# Patient Record
Sex: FEMALE | Race: BLACK OR AFRICAN AMERICAN
[De-identification: names, ages, dates, MRNs, and addresses within clinical notes are randomized per-mention and may not be internally consistent; named-entity substitution may affect disease eponyms.]

---

## 2020-06-23 ENCOUNTER — HOSPITAL ENCOUNTER (OUTPATIENT)
Dept: HOSPITAL 62 - RAD | Age: 20
End: 2020-06-23
Attending: MIDWIFE
Payer: MEDICAID

## 2020-06-23 DIAGNOSIS — Z34.01: Primary | ICD-10-CM

## 2020-06-23 PROCEDURE — 76801 OB US < 14 WKS SINGLE FETUS: CPT

## 2020-06-23 NOTE — RADIOLOGY REPORT (SQ)
EXAM DESCRIPTION:  U/S [id number] TRNABD 1GES W/ODOP



IMAGES COMPLETED DATE/TIME:  6/23/2020 10:32 am



REASON FOR STUDY:  Z34.01 ENCNTR FOR SUPRVSN OF NORMAL FIRST PREG, FIRST TRIMESTER Z34.01  ENCNTR FOR
 SUPRVSN OF NORMAL FIRST PREG, FIRST TRIMES



COMPARISON:  None.



TECHNIQUE:  Transabdominal static and realtime grayscale images acquired of the pelvis. Additional se
lected spectral and color Doppler images recorded. All images stored on PACs.

bHCG: Not applicable.

CLINICAL DATES:  13 weeks 1 day



LIMITATIONS:  None.



FINDINGS:  FETUS:  Single Living intrauterine pregnancy.

ULTRASOUND EGA: 12 weeks 3 days

ULTRASOUND VICTOR HUGO: 1/2/2021

EFW: Not applicable less than 20 weeks.

CRL:  6 cm.

FHR: 158  beats per minute.

FETAL SURVEY: Too early to assess.

AMNIOTIC FLUID: Adequate amount.

PLACENTA: Not yet developed due to early gestation.

SUBCHORIONIC BLEED: No

SIZE OF BLEED: Not applicable.

UTERUS: No masses. No anomalies.

CERVICAL LENGTH: 2.6 cm.   Closed.

RIGHT ADNEXA: Normal ovary with normal vascular flow.  1.7 x 1.2 x 1.6 cm.

No adnexal free fluid.

No adnexal masses.

LEFT ADNEXA: Normal ovary with normal vascular flow.  3.3 x 2.4 x 1.7 cm.

No adnexal free fluid.

No adnexal masses.

FREE FLUID: None.

OTHER: No other significant finding.



IMPRESSION:  LIVING INTRAUTERINE PREGNANCY.

EGA 12 weeks 3 days

Trimester of pregnancy: First trimester - 0 to 13 weeks.



TECHNICAL DOCUMENTATION:  JOB ID:  [id number]

 2011 [employer]- All Rights Reserved                            rev-5/18



Reading location - IP/workstation name: [email or web address]

## 2020-11-16 ENCOUNTER — HOSPITAL ENCOUNTER (OUTPATIENT)
Dept: HOSPITAL 62 - LC | Age: 20
Discharge: HOME | End: 2020-11-16
Attending: OBSTETRICS & GYNECOLOGY
Payer: COMMERCIAL

## 2020-11-16 DIAGNOSIS — Z3A.34: ICD-10-CM

## 2020-11-16 DIAGNOSIS — Z34.03: Primary | ICD-10-CM

## 2020-11-16 PROCEDURE — 59025 FETAL NON-STRESS TEST: CPT

## 2020-11-16 NOTE — NON STRESS TEST REPORT
=================================================================

Non Stress Test

=================================================================

Datetime Report Generated by CPN: 11/16/2020 12:11

   

   

=================================================================

DEMOGRAPHIC

=================================================================

   

Test Number:  1

EGA NST:  34.0

   

=================================================================

INDICATION

=================================================================

   

Indication for Study (NST) Other:  repeat NST NR in office

   

=================================================================

VITAL SIGNS

=================================================================

   

Temperature - NST:  98.8

Pulse - NST:  89

RESP - NST:  15

NBPSYS NST:  104

NBPDIA NST:  57

   

=================================================================

MONITORING

=================================================================

   

Monitor Explained:  Monitor Explained; Test Explained; Patient

   Verbalized Understanding

Time on Monitor:  11/16/2020 11:36

Time off Monitor:  11/16/2020 11:59

NST Duration:  23

   

=================================================================

NST INTERVENTIONS

=================================================================

   

NST Interventions:  PO Hydration; Reposition Patient

Physician Notified NST:  RAUDEL Hicks, RAHEEL

BABY A:  [id number]

   

=================================================================

BABY A

=================================================================

   

Fetal Movement :  Present

Contraction Frequency :  Irritabilty

FHR Baseline :  130

Accelerations :  15X15

Decelerations :  None

Variability :  Moderate 6-25bpm

NST Review:  Meets Criteria for Reactive NST

NST Review and Verified By :  AMBROSIO Turner Results:  Reactive

   

=================================================================

NST REPORT

=================================================================

   

Report Trigger:  Send Report

## 2020-12-22 ENCOUNTER — HOSPITAL ENCOUNTER (INPATIENT)
Dept: HOSPITAL 62 - LC | Age: 20
LOS: 4 days | Discharge: HOME | End: 2020-12-26
Attending: OBSTETRICS & GYNECOLOGY | Admitting: OBSTETRICS & GYNECOLOGY
Payer: COMMERCIAL

## 2020-12-22 DIAGNOSIS — Z20.828: ICD-10-CM

## 2020-12-22 DIAGNOSIS — Z3A.39: ICD-10-CM

## 2020-12-22 DIAGNOSIS — D64.9: ICD-10-CM

## 2020-12-22 DIAGNOSIS — O36.5930: Primary | ICD-10-CM

## 2020-12-22 LAB
ADD MANUAL DIFF: NO
APPEARANCE UR: (no result)
APTT PPP: YELLOW S
BARBITURATES UR QL SCN: NEGATIVE
BASOPHILS # BLD AUTO: 0 10^3/UL (ref 0–0.2)
BASOPHILS NFR BLD AUTO: 0.2 % (ref 0–2)
BILIRUB UR QL STRIP: NEGATIVE
EOSINOPHIL # BLD AUTO: 0.1 10^3/UL (ref 0–0.6)
EOSINOPHIL NFR BLD AUTO: 1.1 % (ref 0–6)
ERYTHROCYTE [DISTWIDTH] IN BLOOD BY AUTOMATED COUNT: 14 % (ref 11.5–14)
GLUCOSE UR STRIP-MCNC: NEGATIVE MG/DL
HCT VFR BLD CALC: 31.4 % (ref 36–47)
HGB BLD-MCNC: 10.8 G/DL (ref 12–15.5)
KETONES UR STRIP-MCNC: NEGATIVE MG/DL
LYMPHOCYTES # BLD AUTO: 1.4 10^3/UL (ref 0.5–4.7)
LYMPHOCYTES NFR BLD AUTO: 26.7 % (ref 13–45)
MCH RBC QN AUTO: 32.4 PG (ref 27–33.4)
MCHC RBC AUTO-ENTMCNC: 34.3 G/DL (ref 32–36)
MCV RBC AUTO: 94 FL (ref 80–97)
METHADONE UR QL SCN: NEGATIVE
MONOCYTES # BLD AUTO: 0.5 10^3/UL (ref 0.1–1.4)
MONOCYTES NFR BLD AUTO: 8.8 % (ref 3–13)
NEUTROPHILS # BLD AUTO: 3.4 10^3/UL (ref 1.7–8.2)
NEUTS SEG NFR BLD AUTO: 63.2 % (ref 42–78)
NITRITE UR QL STRIP: NEGATIVE
PCP UR QL SCN: NEGATIVE
PH UR STRIP: 7 [PH] (ref 5–9)
PLATELET # BLD: 170 10^3/UL (ref 150–450)
PROT UR STRIP-MCNC: NEGATIVE MG/DL
RBC # BLD AUTO: 3.33 10^6/UL (ref 3.72–5.28)
SP GR UR STRIP: 1.01
TOTAL CELLS COUNTED % (AUTO): 100 %
URINE AMPHETAMINES SCREEN: NEGATIVE
URINE BENZODIAZEPINES SCREEN: NEGATIVE
URINE COCAINE SCREEN: NEGATIVE
URINE MARIJUANA (THC) SCREEN: NEGATIVE
UROBILINOGEN UR-MCNC: 2 MG/DL (ref ?–2)
WBC # BLD AUTO: 5.4 10^3/UL (ref 4–10.5)

## 2020-12-22 PROCEDURE — 86900 BLOOD TYPING SEROLOGIC ABO: CPT

## 2020-12-22 PROCEDURE — 86901 BLOOD TYPING SEROLOGIC RH(D): CPT

## 2020-12-22 PROCEDURE — 85027 COMPLETE CBC AUTOMATED: CPT

## 2020-12-22 PROCEDURE — 86592 SYPHILIS TEST NON-TREP QUAL: CPT

## 2020-12-22 PROCEDURE — 88307 TISSUE EXAM BY PATHOLOGIST: CPT

## 2020-12-22 PROCEDURE — 80307 DRUG TEST PRSMV CHEM ANLYZR: CPT

## 2020-12-22 PROCEDURE — 86850 RBC ANTIBODY SCREEN: CPT

## 2020-12-22 PROCEDURE — 36415 COLL VENOUS BLD VENIPUNCTURE: CPT

## 2020-12-22 PROCEDURE — 85025 COMPLETE CBC W/AUTO DIFF WBC: CPT

## 2020-12-22 PROCEDURE — 81005 URINALYSIS: CPT

## 2020-12-23 NOTE — ADMISSION PHYSICAL
=================================================================



=================================================================

Datetime Report Generated by CPN: 2020 10:09

   

   

=================================================================

CURRENT ADMISSION

=================================================================

   

Chief Complaint:  Scheduled Induction of Labor

Indication for Induction:  IUGR

Admit Impression :  Term, Intrauterine Pregnancy; No Active Labor

Admit Plan:  Initiate Labor Induction Protocol

Admit Plan- Other:  GBS neg

   

=================================================================

ALLERGIES

=================================================================

   

Medication Allergies:  No

Medication Allergies:  No Known Allergies (2020)

Latex:  No Latex Allergies

   

=================================================================

OBSTETRICAL HISTORY

=================================================================

   

EDC:  2020 00:00

:  1

Para:  0

Term:  0

:  0

SAB:  0

IAB:  0

Ectopic:  0

Livin

Cesareans:  0

VBACs:  0

Multiple Births:  0

Gestational Diabetes:  No

Rh Sensitization:  No

Incompetent Cervix:  No

REILLY:  No

Infertility:  No

ART Treatment:  No

Uterine Anomaly:  No

IUGR:  No

Hx Previous C/S:  No

Macrosomia:  No

Hx Loss/Stillborn:  No

PIH:  No

Hx  Death:  No

Placenta Previa/Abruption:  No

Depression/PP Depression:  No

PTL/PROM:  No

Post Partum Hemorrhage:  No

Current Pregnancy Procedures:  Ultrasound; NST

Obstetrical History Comments:  G1- Current IUGR 

   

=================================================================

***SEE PRENATAL RECORDS***

=================================================================

   

Alcohol:  No

Marijuana :  No

Cocaine:  No

Other Illicit Drugs:  No

Cigarettes:  Never Smoker. [id number]

   

=================================================================

MEDICAL HISTORY

=================================================================

   

Diabetes:  No

Blood Transfusion:  No

Pulmonary Disease (Asthma, TB):  No

Breast Disease:  No

Hypertension:  No

Gyn Surgery:  No

Heart Disease:  No

Hosp/Surgery:  No

Autoimmune Disorder:  No

Anesthetic Complications:  No

Kidney Disease:  No

Abnormal Pap Smear:  No

Neuro/Epilepsy:  No

Psychiatric Disorders:  No

Other Medical Diseases:  Yes

Hepatitis/Liver Disease:  No

Significant Family History:  No

Varicosities/Phlebitis:  No

Trauma/Violence :  No

Thyroid Dysfunction:  No

Medical History Comments:  Anemia with pregnancy 

   

=================================================================

INFECTIOUS HISTORY

=================================================================

   

Gonorrhea:  No

Genital Herpes:  No

Chlamydia:  No

Tuberculosis:  No

Syphilis:  No

Hepatitis:  No

HIV/AIDS Exposure:  No

Rash or Viral Illness:  No

HPV:  No

   

=================================================================

PHYSICAL EXAM

=================================================================

   

General:  Normal

HEENT:  Deferred

Neurologic:  Normal

Thyroid:  Deferred

Heart:  Normal

Lungs:  Normal

Breast:  Deferred

Back:  Deferred

Abdomen:  Normal

Genitourinary Exam:  Normal

Extremities:  Normal

DTRs:  Deferred

Pelvic Type:  Adequate

Vital Signs:  Reviewed

   

=================================================================

MEMBRANES

=================================================================

   

Membranes:  Intact

   

=================================================================

FETUS A

=================================================================

   

EGA:  39.2

Monitoring:  External US

FHR- Baseline:  120

Variability:  Moderate 6-25bpm

Accelerations:  15X15

Decelerations:  None

Fetal Presentation:  Vertex

Admit Comment:  had cervidil last night, plan to start pitocin today 

   plan per Dr. Dalton

   EFW 2599g on 20 at [facility] 6th%tile

   

=================================================================

PLANS FOR LABOR AND DELIVERY

=================================================================

   

Labor and Delivery:  None

Pain Management:  Epidural (Annotations: Data stored by N on behalf

   of user)

Feeding Preference:  Breast

Benefit of Breast Feed Discussed:  Yes

Circumcision:  N/A

   

=================================================================

INFORMED CONSENT

=================================================================

   

Assignment:  Joo Dalton MD

Signature:  Electronically signed by Katheryn Pinto CNM on 2020 at

   10:08  with User ID: [email or web address]

:  Electronically signed by Katheryn Pinto CNM on 2020 at 10:08 

   with User ID: [email or web address]

## 2020-12-23 NOTE — L&D PROGRESS NOTES
=================================================================

PROGRESS NOTES

=================================================================

Datetime Report Generated by CPN: 12/23/2020 16:44

   

   

=================================================================

PROGRESS NOTE

=================================================================

   

Procedures:  Sterile Vag Exam

Plan:  Continue Present Management

Comment:  plan per Dr. Dalton to stop pit this pm and place cervidil

   

=================================================================

VAGINAL EXAM

=================================================================

   

Dilatation:  1

Effacement:  50

   

=================================================================

LAST VAGINAL EXAM-NURSING

=================================================================

   

Nursing Exam Dilitation:  1.0

Nursing Exam Effacement:  50

Nursing Exam Station:  -2

Nursing Exam Contractions:  Unable to determine cx pattern d/t pt

   movement, TOCO readjusted 

   

=================================================================

MEMBRANES

=================================================================

   

Membranes:  Intact

   

=================================================================

FETUS A

=================================================================

   

FHR - Baseline:  125

Monitoring:  External US

Variability:  Moderate 6-25bpm

Decelerations:  None

:  39.2

Fetal Presentation:  Vertex

   

=================================================================

SIGNATURE

=================================================================

   

SIGNATURE:  [id number];[id number];[id number]

Assignment:  Joo Dalton MD

Signature:  Electronically signed by Katheryn Pinto CNM on 12/23/2020 at

   16:42  with User ID: [email or web address]

:  Electronically signed by Katheryn Pinto CNM on 12/23/2020 at 16:42 

   with User ID: [email or web address]

## 2020-12-24 PROCEDURE — 10907ZC DRAINAGE OF AMNIOTIC FLUID, THERAPEUTIC FROM PRODUCTS OF CONCEPTION, VIA NATURAL OR ARTIFICIAL OPENING: ICD-10-PCS | Performed by: OBSTETRICS & GYNECOLOGY

## 2020-12-24 PROCEDURE — 3E033VJ INTRODUCTION OF OTHER HORMONE INTO PERIPHERAL VEIN, PERCUTANEOUS APPROACH: ICD-10-PCS | Performed by: OBSTETRICS & GYNECOLOGY

## 2020-12-24 NOTE — L&D PROGRESS NOTES
=================================================================

PROGRESS NOTES

=================================================================

Datetime Report Generated by CPN: 12/24/2020 10:41

   

   

=================================================================

PROGRESS NOTE

=================================================================

   

Impression:  Normal Progression of Labor

Procedures:  Artificial ROM; Sterile Vag Exam

Plan:  Continue Present Management; Induction; Cervical Ripening

Informed Consent Obtained:  Vaginal Delivery; Induction of Labor;

   Risks, Benefits and Alternatives Discussed

Comment:  1/50/-3, pitocin started for IOL and continued cervical

   ripening.

   

=================================================================

VAGINAL EXAM

=================================================================

   

Dilatation:  1

Effacement:  50

   

=================================================================

LAST VAGINAL EXAM-NURSING

=================================================================

   

Nursing Exam Dilitation:  1.0

Nursing Exam Effacement:  thick

Nursing Exam Station:  -2

Nursing Exam Contractions:  unable to determine pattern d/t pt up to BR

   . RN at bedside readjusting TOCO 

   

=================================================================

MEMBRANES

=================================================================

   

Membranes:  Intact

   

=================================================================

FETUS A

=================================================================

   

FHR - Baseline:  120

Monitoring:  External US

Variability:  Moderate 6-25bpm

Decelerations:  None

FHR Category:  Category I

:  39.2

Fetal Presentation:  Vertex

   

=================================================================

SIGNATURE

=================================================================

   

SIGNATURE:  [id number];14,[id number];10,[id number]

Assignment:  Joo Dalton MD

Signature:  Electronically signed by Chyna Escamilla MD (CHAD) on

   12/24/2020 at 10:41  with User ID: Ana Laura

:  Electronically signed by Chyna Escamilla MD (CHAD) on 12/24/2020 at

   10:41  with User ID: Ana Laura

## 2020-12-24 NOTE — L&D PROGRESS NOTES
=================================================================

PROGRESS NOTES

=================================================================

Datetime Report Generated by CPN: 2020 17:42

   

   

=================================================================

PROGRESS NOTE

=================================================================

   

Impression:  Normal Progression of Labor

Procedures:  Artificial ROM; Sterile Vag Exam

Plan:  Continue Present Management; Induction

Informed Consent Obtained:  Vaginal Delivery; Induction of Labor;

   Risks, Benefits and Alternatives Discussed

Vital Signs :  Reviewed

Comment:  Cooks catheter evaluation performed and both bulbs in vagina.

   Cooks catheter removed.  Cvx 6-7/70/-1.  AROM clear fluid. 

   Anticipate .

   

=================================================================

VAGINAL EXAM

=================================================================

   

Dilatation:  1

Effacement:  50

   

=================================================================

LAST VAGINAL EXAM-NURSING

=================================================================

   

Nursing Exam Dilitation:  6.0

Nursing Exam Effacement:  thick

Nursing Exam Station:  -2

Nursing Exam Contractions:  unable to determine cx pattern. 

   

=================================================================

MEMBRANES

=================================================================

   

Membranes:  Ruptured

Amniotic Fluid Color:  Clear

   

=================================================================

FETUS A

=================================================================

   

FHR - Baseline:  120

Monitoring:  External US

Variability:  Moderate 6-25bpm

Accelerations:  15X15

Decelerations:  Variable

FHR Category:  Category I

:  39.2

Fetal Presentation:  Vertex

   

=================================================================

SIGNATURE

=================================================================

   

SIGNATURE:  [id number];14,[id number];13,[id number]

Assignment:  Joo Dalton MD

Signature:  Electronically signed by Chyna Escamilla MD (CHAD) on

   2020 at 17:41  with User ID: Ana Laura

:  Electronically signed by Chyna Escamilla MD (CHAD) on 2020 at

   17:41  with User ID: Ana Laura

## 2020-12-25 RX ADMIN — DOCUSATE SODIUM SCH MG: 100 CAPSULE, LIQUID FILLED ORAL at 12:04

## 2020-12-25 RX ADMIN — FERROUS SULFATE TAB 325 MG (65 MG ELEMENTAL FE) SCH MG: 325 (65 FE) TAB at 18:04

## 2020-12-25 RX ADMIN — DOCUSATE SODIUM SCH MG: 100 CAPSULE, LIQUID FILLED ORAL at 18:04

## 2020-12-25 RX ADMIN — IBUPROFEN SCH MG: 800 TABLET, FILM COATED ORAL at 21:47

## 2020-12-25 RX ADMIN — IBUPROFEN SCH MG: 800 TABLET, FILM COATED ORAL at 06:04

## 2020-12-25 RX ADMIN — SENNOSIDES, DOCUSATE SODIUM SCH EACH: 50; 8.6 TABLET, FILM COATED ORAL at 12:04

## 2020-12-25 RX ADMIN — IBUPROFEN SCH MG: 800 TABLET, FILM COATED ORAL at 14:35

## 2020-12-25 RX ADMIN — Medication SCH CAP: at 12:04

## 2020-12-25 RX ADMIN — FERROUS SULFATE TAB 325 MG (65 MG ELEMENTAL FE) SCH MG: 325 (65 FE) TAB at 12:04

## 2020-12-25 NOTE — PDOC PROGRESS REPORT
Subjective-OB


Progress Note for:: 12/25/20


Subjective: 





Doing well, no c/o, breastfeeding, ambulating, voiding, scant lochia





Physical Exam (OB)


Vital Signs: 


                                        











Temp Pulse Resp BP Pulse Ox


 


 98.2 F   83   17   95/65 L  98 


 


 12/25/20 07:56  12/25/20 07:56  12/25/20 07:56  12/25/20 07:56  12/25/20 07:56








                                 Intake & Output











 12/24/20 12/25/20 12/26/20





 06:59 06:59 06:59


 


Intake Total  400 


 


Output Total  800 


 


Balance  -400 














- PIH/Pre-Eclampsia


Clonus: Negative


Headache: Absent


Epigastric Pain: No


Visual Changes: No





- Maternal Morbidity


59. Maternal Morbidity (serious complications experinced by the mother 

associated with labor and delivery: None of the above





- Lochia


Lochia Amount: Small 10-25 ml


Lochia Color: Rubra/Red





- Abdomen


Description: Soft, Round


Hernia Present: No


Fundal Description: Firm, Midline


Fundal Height: u/u - u/2





Objective-Diagnostic


Laboratory: 


                                        





                                 12/22/20 20:35 











Assessment and Plan(PN)





- Assessment and Plan


(1) Encounter for induction of labor


Is this a current diagnosis for this admission?: Yes   





(2) Intrauterine growth restriction affecting antepartum care of mother


Qualifiers: 


   Fetus number: single or unspecified fetus   Qualified Code(s): O36.5990 - Ma

ternal care for other known or suspected poor fetal growth, unspecified 

trimester, not applicable or unspecified   


Is this a current diagnosis for this admission?: Yes   





(3) Vaginal delivery


Is this a current diagnosis for this admission?: Yes   





- Time Spent with Patient


Time with patient: Less than 15 minutes


Medications reviewed and adjusted accordingly: Yes





- Disposition


Anticipated Discharge Disposition: Home, Self Care


Anticipated Discharge Timeframe: within 24 hours

## 2020-12-26 VITALS — SYSTOLIC BLOOD PRESSURE: 95 MMHG | DIASTOLIC BLOOD PRESSURE: 65 MMHG

## 2020-12-26 LAB
ERYTHROCYTE [DISTWIDTH] IN BLOOD BY AUTOMATED COUNT: 14.2 % (ref 11.5–14)
HCT VFR BLD CALC: 33 % (ref 36–47)
HGB BLD-MCNC: 11.3 G/DL (ref 12–15.5)
MCH RBC QN AUTO: 32 PG (ref 27–33.4)
MCHC RBC AUTO-ENTMCNC: 34.3 G/DL (ref 32–36)
MCV RBC AUTO: 93 FL (ref 80–97)
PLATELET # BLD: 136 10^3/UL (ref 150–450)
RBC # BLD AUTO: 3.54 10^6/UL (ref 3.72–5.28)
WBC # BLD AUTO: 6.8 10^3/UL (ref 4–10.5)

## 2020-12-26 RX ADMIN — DOCUSATE SODIUM SCH: 100 CAPSULE, LIQUID FILLED ORAL at 10:09

## 2020-12-26 RX ADMIN — IBUPROFEN SCH: 800 TABLET, FILM COATED ORAL at 14:44

## 2020-12-26 RX ADMIN — IBUPROFEN SCH MG: 800 TABLET, FILM COATED ORAL at 05:05

## 2020-12-26 RX ADMIN — FERROUS SULFATE TAB 325 MG (65 MG ELEMENTAL FE) SCH MG: 325 (65 FE) TAB at 10:07

## 2020-12-26 RX ADMIN — SENNOSIDES, DOCUSATE SODIUM SCH: 50; 8.6 TABLET, FILM COATED ORAL at 14:44

## 2020-12-26 RX ADMIN — Medication SCH CAP: at 10:07

## 2020-12-26 NOTE — PDOC DISCHARGE SUMMARY
Impression





- Admit/DC Date/PCP


Admission Date/Primary Care Provider: 


  12/22/20 20:50





  FATOUMATA BROWN MD





Discharge Date: 12/26/20





- Discharge Diagnosis


(1) Encounter for induction of labor


Is this a current diagnosis for this admission?: Yes   





(2) Intrauterine growth restriction affecting antepartum care of mother


Is this a current diagnosis for this admission?: Yes   





(3) Vaginal delivery


Is this a current diagnosis for this admission?: Yes   





- Additional Information


Resuscitation Status: Full Code


Discharge Diet: As Tolerated


Discharge Activity: Activity As Tolerated, Balance Activity w/Rest, No 

Lifting/Push/Pulling, Pelvic Rest, No tub bath, Walk Frequently


Referrals: 


FATOUMATA BROWN MD [Primary Care Provider] - 


Prescriptions: 


Ibuprofen [Motrin 800 mg Tablet] 800 mg PO Q8 30 Days #90 tablet


Home Medications: 








Iron 325 mg PO DAILY 11/16/20 


Prenat 115/Iron Fum/Folic/Dss [Prenatal 19 Tablet] 1 each PO DAILY 11/16/20 


Ibuprofen [Motrin 800 mg Tablet] 800 mg PO Q8 30 Days #90 tablet 12/26/20 











HPI


Reason(s) for Admission: Induction of Labor, Other


Prenatal Procedures: None


Intrapartum Procedure(s): Spontaneous Vaginal Delivery





Hospital Course


59. Maternal Morbidity (serious complications experinced by the mother 

associated with labor and delivery: None of the above





Results


Laboratory Results: 


                                        











WBC  6.8 10^3/uL (4.0-10.5)   12/26/20  07:44    


 


RBC  3.54 10^6/uL (3.72-5.28)  L  12/26/20  07:44    


 


Hgb  11.3 g/dL (12.0-15.5)  L  12/26/20  07:44    


 


Hct  33.0 % (36.0-47.0)  L  12/26/20  07:44    


 


MCV  93 fl (80-97)   12/26/20  07:44    


 


MCH  32.0 pg (27.0-33.4)   12/26/20  07:44    


 


MCHC  34.3 g/dL (32.0-36.0)   12/26/20  07:44    


 


RDW  14.2 % (11.5-14.0)  H  12/26/20  07:44    


 


Plt Count  136 10^3/uL (150-450)  L  12/26/20  07:44    


 


Lymph % (Auto)  26.7 % (13-45)   12/22/20  20:35    


 


Mono % (Auto)  8.8 % (3-13)   12/22/20  20:35    


 


Eos % (Auto)  1.1 % (0-6)   12/22/20  20:35    


 


Baso % (Auto)  0.2 % (0-2)   12/22/20  20:35    


 


Absolute Neuts (auto)  3.4 10^3/uL (1.7-8.2)   12/22/20  20:35    


 


Absolute Lymphs (auto)  1.4 10^3/uL (0.5-4.7)   12/22/20  20:35    


 


Absolute Monos (auto)  0.5 10^3/uL (0.1-1.4)   12/22/20  20:35    


 


Absolute Eos (auto)  0.1 10^3/uL (0.0-0.6)   12/22/20  20:35    


 


Absolute Basos (auto)  0.0 10^3/uL (0.0-0.2)   12/22/20  20:35    


 


Seg Neutrophils %  63.2 % (42-78)   12/22/20  20:35    


 


Urine Color  YELLOW   12/22/20  19:47    


 


Urine Appearance  SLIGHTLY-CLOUDY   12/22/20  19:47    


 


Urine pH  7.0  (5.0-9.0)   12/22/20  19:47    


 


Ur Specific Gravity  1.013   12/22/20  19:47    


 


Urine Protein  NEGATIVE mg/dL (NEGATIVE)   12/22/20  19:47    


 


Urine Glucose (UA)  NEGATIVE mg/dL (NEGATIVE)   12/22/20  19:47    


 


Urine Ketones  NEGATIVE mg/dL (NEGATIVE)   12/22/20  19:47    


 


Urine Blood  NEGATIVE  (NEGATIVE)   12/22/20  19:47    


 


Urine Nitrite  NEGATIVE  (NEGATIVE)   12/22/20  19:47    


 


Urine Bilirubin  NEGATIVE  (NEGATIVE)   12/22/20  19:47    


 


Urine Urobilinogen  2.0 mg/dL (<2.0)  H  12/22/20  19:47    


 


Ur Leukocyte Esterase  NEGATIVE  (NEGATIVE)   12/22/20  19:47    


 


Urine Ascorbic Acid  40  (NEGATIVE)  H  12/22/20  19:47    


 


Urine Opiates Screen  NEGATIVE   12/22/20  19:47    


 


Urine Methadone Screen  NEGATIVE   12/22/20  19:47    


 


Ur Barbiturates Screen  NEGATIVE   12/22/20  19:47    


 


Ur Phencyclidine Scrn  NEGATIVE   12/22/20  19:47    


 


Ur Amphetamines Screen  NEGATIVE   12/22/20  19:47    


 


U Benzodiazepines Scrn  NEGATIVE   12/22/20  19:47    


 


Urine Cocaine Screen  NEGATIVE   12/22/20  19:47    


 


U Marijuana (THC) Screen  NEGATIVE   12/22/20  19:47    


 


RPR  NONREACTIVE  (NONREACTIVE)   12/22/20  20:35    


 


Blood Type  B POSITIVE   12/22/20  20:35    


 


Antibody Screen  NEGATIVE   12/22/20  20:35

## 2020-12-26 NOTE — PDOC PROGRESS REPORT
Subjective-OB


Progress Note for:: 12/26/20


Subjective: 





Doing well, no c/o, would like to go home today, breastfeeding, lochia scant





Physical Exam (OB)


Vital Signs: 


                                        











Temp Pulse Resp BP Pulse Ox


 


 97.4 F   75   16   95/51 L  98 


 


 12/26/20 07:54  12/26/20 07:14  12/26/20 07:14  12/26/20 07:14  12/26/20 07:14








                                 Intake & Output











 12/25/20 12/26/20 12/27/20





 06:59 06:59 06:59


 


Intake Total 400 1220 


 


Output Total 800  


 


Balance -400 1220 














- PIH/Pre-Eclampsia


DTR's: 1 +


Clonus: Negative


Headache: Absent


Epigastric Pain: No


Visual Changes: No





- Maternal Morbidity


59. Maternal Morbidity (serious complications experinced by the mother 

associated with labor and delivery: None of the above





- Lochia


Lochia Amount: Scant < 10 ml


Lochia Color: Rubra/Red





- Abdomen


Description: Soft, Round


Hernia Present: No


Fundal Description: Firm, Midline


Fundal Height: u/u - u/2





Objective-Diagnostic


Laboratory: 


                                        





                                 12/26/20 07:44 





                                        











  12/26/20





  07:44


 


WBC  6.8


 


RBC  3.54 L


 


Hgb  11.3 L


 


Hct  33.0 L


 


MCV  93


 


MCH  32.0


 


MCHC  34.3


 


RDW  14.2 H


 


Plt Count  136 L














Assessment and Plan(PN)





- Assessment and Plan


(1) Encounter for induction of labor


Is this a current diagnosis for this admission?: Yes   





(2) Intrauterine growth restriction affecting antepartum care of mother


Qualifiers: 


   Fetus number: single or unspecified fetus   Qualified Code(s): O36.5990 - 

Maternal care for other known or suspected poor fetal growth, unspecified trim

pauline, not applicable or unspecified   


Is this a current diagnosis for this admission?: Yes   





(3) Vaginal delivery


Is this a current diagnosis for this admission?: Yes   





- Time Spent with Patient


Time with patient: Less than 15 minutes


Medications reviewed and adjusted accordingly: Yes





- Disposition


Anticipated Discharge Disposition: Home, Self Care


Anticipated Discharge Timeframe: within 24 hours

## 2020-12-29 NOTE — DELIVERY SUMMARY
=================================================================

Del Sum A-C

=================================================================

Datetime Report Generated by CPN: 2020 11:46

   

   

=================================================================

DELIVERY PERSONNEL

=================================================================

   

DELIVERY PERSONNEL:  [id number]

Delivery Doctor::  Chyna Ecsamilla MD

Labor and Delivery Nurse::  Deb Hutson RN

Labor and Delivery Nurse::  Shiela Mercer RN

Scrub Tech/CNA:  Yogi Rios, CST

   

=================================================================

MATERNAL INFORMATION

=================================================================

   

Delivery Anesthesia:  Epidural

Medications After Delivery:  Pitocin 30 Units in 500ml NS/D5W

Estimated  Blood Loss (ml):  250

Delivery QBL:  250

Maternal Complications:  None

Provider Comments:  VFI delivered in LIZZETH presentation with nuchal and

   body cord.  Shoulders and body delivered without difficulty.  COrd

   doubly clamped and cut after delay.  Placenta delivered intact

   spontaneously.  FF at U.  No perineal lacerations.  Mother and baby

   stable upon provider leaving the room.

   

=================================================================

LABOR SUMMARY

=================================================================

   

EDC:  2020 00:00

No. Babies in Womb:  1

 Attempted:  No

Labor Anesthesia:  Epidural

   

=================================================================

LABOR INFORMATION

=================================================================

   

Reason for Induction:  Intrauterine Growth Retardation

Onset of Labor:  2020 17:14

Complete Dilatation:  2020 22:38

Cervical Ripening Agents:  Cervidil

Cervical Ripening Agents:  Cervidil

Oxytocin:  Induction

Group B Beta Strep:  negative (Annotations: Data stored by [facility] on

   behalf of user)

Steroids Given:  None

Reason Steroids Not Administered:  Not Applicable

   

=================================================================

MEMBRANES

=================================================================

   

Membranes Rupture Method:  Artificial

Rupture of Membranes:  2020 17:14

Length of Rupture (hr):  6.80

Amniotic Fluid Color:  Clear

Amniotic Fluid Amount:  Small

Amniotic Fluid Odor:  Normal

   

=================================================================

STAGES OF LABOR

=================================================================

   

Stage 1 hr:  5

Stage 1 min:  24

Stage 2 hr:  1

Stage 2 min:  24

Stage 3 hr:  0

Stage 3 min:  3

Total Time in Labor hr:  6

Total Time in Labor min:  51

   

=================================================================

VAGINAL DELIVERY

=================================================================

   

Episiotomy:  None

Laceration #1:  None

Laceration Extension #1:  N/A

Laceration Repair:  Not Applicable

Sponge Count Correct:  Yes

Sharps Count Correct:  Yes

   

=================================================================

CSECTION DELIVERY

=================================================================

   

Primary Indication:  N/A

Secondary Indication:  N/A

CSection Incidence:  N/A

Labor:  N/A

Elective:  N/A

CSection Incision:  N/A

   

=================================================================

BABY A INFORMATION

=================================================================

   

Infant Delivery Date/Time:  2020 00:02

Method of Delivery:  Vaginal

Nurse Controlled Delivery:  No

Born in Route :  No

:  N/A

Forceps:  N/A

Vacuum Extraction:  N/A

Shoulder Dystocia :  No

   

=================================================================

PRESENTATION/POSITION BABY A

=================================================================

   

Presentation:  Cephalic

Cephalic Presentation:  Vertex

Vertex Position:  Left Occipital Anterior

Breech Presentation:  N/A

   

=================================================================

PLACENTA INFORMATION BABY A

=================================================================

   

Placenta Delivery Time :  2020 00:05

Placenta Method of Delivery:  Spontaneous

Placenta Status:  Delivered

   

=================================================================

APGAR SCORES BABY A

=================================================================

   

Heart Rate 1 min:  >100 bpm

Resp Effort 1 min:  Good Cry

Reflex Irritability 1 min:  Cough or Sneeze or Pulls Away

Muscle Tone 1 min:  Some Flexion of Extremities

Color 1 min:  Body Pink, Extremities Blue

Resuscitation Effort 1 min:  Tactile Stimulation

APGAR SCORE 1 MIN:  8

Heart Rate 5 min:  >100 bpm

Resp Effort 5 min:  Good Cry

Reflex Irritability 5 min:  Cough or Sneeze or Pulls Away

Muscle Tone 5 min:  Active Motion

Color 5 min:  Body Pink, Extremities Blue

Resuscitation Effort 5 min:  Tactile Stimulation

APGAR SCORE 5 MIN:  9

   

=================================================================

INFANT INFORMATION BABY A

=================================================================

   

Gestational Age at Delivery:  39.4

Gestational Status:  Full Term- 39- 40.6 Weeks

Infant Outcome :  Liveborn

Infant Condition :  Stable

Infant Sex:  Female

   

=================================================================

IDENTIFICATION BABY A

=================================================================

   

Infant Verification Date/Time:  2020 00:55

ID Band Number:  [id number]

Mother's Name Verified:  Yes

Infant Medical Record Number:  [id number]

RN Verifying Infant:  EDimitri Hutson RN/ R. Dianamitch RN

   

=================================================================

WEIGHT/LENGTH BABY A

=================================================================

   

Infant Birthweight (gm):  2850

Infant Weight (lb):  6

Infant Weight (oz):  5

Infant Length (in):  19.50

Infant Length (cm):  49.53

   

=================================================================

CORD INFORMATION BABY A

=================================================================

   

No. Cord Vessels:  3

Nuchal Cord :  Around Neck x1, Loose

Cord Blood Taken:  Yes-For Storage (Mom's Blood type +)

   

=================================================================

ASSESSMENT BABY A

=================================================================

   

Skin to Skin:  Yes

   

=================================================================

BABY B INFORMATION

=================================================================

   

 :  N/A

   

=================================================================

SIGNATURES

=================================================================

   

Signature:  Electronically signed by Chyna Escamilla MD ([email or web address]) on

   2020 at 00:51  with User ID: [email or web address]

:  I was personally available for consultation and serving as

   supervising physician for the MLP.

:  I was personally available for consultation and serving as

   supervising physician for the MLP.